# Patient Record
Sex: FEMALE | Employment: FULL TIME | URBAN - METROPOLITAN AREA
[De-identification: names, ages, dates, MRNs, and addresses within clinical notes are randomized per-mention and may not be internally consistent; named-entity substitution may affect disease eponyms.]

---

## 2018-10-11 ENCOUNTER — OFFICE VISIT (OUTPATIENT)
Dept: INTERNAL MEDICINE | Facility: CLINIC | Age: 28
End: 2018-10-11
Payer: COMMERCIAL

## 2018-10-11 VITALS
WEIGHT: 154.3 LBS | HEART RATE: 71 BPM | BODY MASS INDEX: 22.78 KG/M2 | DIASTOLIC BLOOD PRESSURE: 68 MMHG | TEMPERATURE: 98.1 F | OXYGEN SATURATION: 99 % | SYSTOLIC BLOOD PRESSURE: 100 MMHG

## 2018-10-11 DIAGNOSIS — O22.00 VARICOSE VEINS DURING PREGNANCY, ANTEPARTUM: Primary | ICD-10-CM

## 2018-10-11 PROCEDURE — 99213 OFFICE O/P EST LOW 20 MIN: CPT | Performed by: PHYSICIAN ASSISTANT

## 2018-10-11 NOTE — MR AVS SNAPSHOT
"              After Visit Summary   10/11/2018    Omari Guan    MRN: [id number]           Patient Information     Date Of Birth          1990        Visit Information        Provider Department      10/11/2018 6:00 PM Carrie Lucero PA-C [facility]        Today's Diagnoses     Varicose veins during pregnancy, antepartum    -  1       Follow-ups after your visit        Who to contact     If you have questions or need follow up information about today's clinic visit or your schedule please contact [facility] directly at [phone].  Normal or non-critical lab and imaging results will be communicated to you by MyChart, letter or phone within 4 business days after the clinic has received the results. If you do not hear from us within 7 days, please contact the clinic through [employer] or phone. If you have a critical or abnormal lab result, we will notify you by phone as soon as possible.  Submit refill requests through [employer] or call your pharmacy and they will forward the refill request to us. Please allow 3 business days for your refill to be completed.          Additional Information About Your Visit        MyChart Information     [employer] lets you send messages to your doctor, view your test results, renew your prescriptions, schedule appointments and more. To sign up, go to [email or web address] . Click on \"Log in\" on the left side of the screen, which will take you to the Welcome page. Then click on \"Sign up Now\" on the right side of the page.     You will be asked to enter the access code listed below, as well as some personal information. Please follow the directions to create your username and password.     Your access code is: [email or web address]  Expires: 2019  6:25 PM     Your access code will  in 90 days. If you need help or a new code, please call your [facility] [place] or [phone].        Care EveryWhere ID     This is your " Care EveryWhere ID. This could be used by other organizations to access your [place] medical records  [id number]        Your Vitals Were     Pulse Temperature Pulse Oximetry BMI (Body Mass Index)          71 98.1  F (36.7  C) (Oral) 99% 22.78 kg/m2         Blood Pressure from Last 3 Encounters:   10/11/18 100/68   12/28/15 100/64   07/08/15 107/73    Weight from Last 3 Encounters:   10/11/18 154 lb 4.8 oz (70 kg)   12/28/15 148 lb 4.8 oz (67.3 kg)              Today, you had the following     No orders found for display         Today's Medication Changes          These changes are accurate as of 10/11/18  6:25 PM.  If you have any questions, ask your nurse or doctor.               Start taking these medicines.        Dose/Directions    order for DME   Used for:  Varicose veins during pregnancy, antepartum   Started by:  Carrie Lucero PA-C        Equipment being ordered: Full length compression tights  30-40 mmHg   Quantity:  1 Units   Refills:  1            Where to get your medicines      Some of these will need a paper prescription and others can be bought over the counter.  Ask your nurse if you have questions.     Bring a paper prescription for each of these medications     order for DME                Primary Care Provider Fax #    Physician No Ref-Primary [phone]       No address on file        Equal Access to Services     JANES OROZCO AH: Hadii ruthy romeroo Sorachid, waaxda luqadaha, qaybta kaalmada adeegkarl, coco mantilla. So [facility] [phone].    ATENCIÓN: Si habla español, tiene a yoder disposición servicios gratuitos de asistencia lingüística. Llame al [phone].    We comply with applicable federal civil rights laws and [place] laws. We do not discriminate on the basis of race, color, national origin, age, disability, sex, sexual orientation, or gender identity.            Thank you!     Thank you for choosing [facility]  for your  care. Our goal is always to provide you with excellent care. Hearing back from our patients is one way we can continue to improve our services. Please take a few minutes to complete the written survey that you may receive in the mail after your visit with us. Thank you!             Your Updated Medication List - Protect others around you: Learn how to safely use, store and throw away your medicines at www.disposemymeds.org.          This list is accurate as of 10/11/18  6:25 PM.  Always use your most recent med list.                   Brand Name Dispense Instructions for use Diagnosis    calcium-vitamin D-vitamin K 500-500-40 MG-UNT-MCG Chew    VIACTIV    100 tablet    Take 1 tablet by mouth 2 times daily (with meals) INDICATION: FOR BONE AND BREAST HEALTH, CHOCOLATE FLAVOR    Breast feeding status of mother       cholecalciferol 2000 units Caps     100 capsule    Take 2 capsules by mouth daily FOR VITAMIN D DEFICIENCY (LOW VITAMIN D)    Breast feeding status of mother       order for DME     1 Units    Equipment being ordered: Full length compression tights  30-40 mmHg    Varicose veins during pregnancy, antepartum       PRENATAL LOW IRON 27-1 MG Tabs     90 tablet    Take 1 tablet by mouth daily INDICATION: VITAMIN SUPPLEMENTATION    Breast feeding status of mother

## 2018-10-11 NOTE — PROGRESS NOTES
SUBJECTIVE:   Omari Guan is a 27 year old female who presents to clinic today for the following health issues:    Patient notes concerns for varicose veins. She is currently 19 weeks pregnant. She is planning to schedule an appointment with her midwife, but has not done so. She is planning on starting prenatal vitamins, but was unable to take them for some time due to vomiting. Pt notes leg soreness due to varicose veins. Pt notes it it is worsen when she is standing. Pt has not tried anything.     Problem list and histories reviewed & adjusted, as indicated.  Additional history: as documented    Reviewed and updated as needed this visit by clinical staff  Tobacco  Meds  Problems       Reviewed and updated as needed this visit by Provider  Meds  Problems         OBJECTIVE:     /68  Pulse 71  Temp 98.1  F (36.7  C) (Oral)  Wt 154 lb 4.8 oz (70 kg)  SpO2 99%  BMI 22.78 kg/m2  Body mass index is 22.78 kg/(m^2).  GENERAL: healthy, alert and no distress  MS: varicose veins noted without swelling, erythema or calf tenderness      ASSESSMENT/PLAN:     1. Varicose veins during pregnancy, antepartum  - treatment with compression stockings   - order for DME; Equipment being ordered: Full length compression tights    30-40 mmHg  Dispense: 1 Units; Refill: 1  - pt to schedule follow up with midwife   - pt agreed to plan and all questions are answered     Carrie Larkin PA-C  [facility]

## 2018-11-02 ENCOUNTER — TELEPHONE (OUTPATIENT)
Dept: INTERNAL MEDICINE | Facility: CLINIC | Age: 28
End: 2018-11-02

## 2018-11-02 DIAGNOSIS — I83.92 VARICOSE VEINS OF LEFT LOWER EXTREMITY, UNSPECIFIED WHETHER COMPLICATED: Primary | ICD-10-CM

## 2018-11-02 NOTE — TELEPHONE ENCOUNTER
Patient would like a prescriptions for thigh high compression socks because the tights are painful around her stomach.

## 2018-11-02 NOTE — TELEPHONE ENCOUNTER
New Compression stocking DME order Td'up. Please print and sign if appropriate.    Yanet MOTA RN, BSN, PHN

## 2018-11-05 NOTE — TELEPHONE ENCOUNTER
Please send to [facility] [phone]. Pt is requesting for compression to be less is this possible with the diagnosis?

## 2018-11-30 ENCOUNTER — OFFICE VISIT (OUTPATIENT)
Dept: INTERNAL MEDICINE | Facility: CLINIC | Age: 28
End: 2018-11-30
Payer: COMMERCIAL

## 2018-11-30 VITALS
WEIGHT: 167.4 LBS | DIASTOLIC BLOOD PRESSURE: 60 MMHG | TEMPERATURE: 98.6 F | BODY MASS INDEX: 24.71 KG/M2 | OXYGEN SATURATION: 100 % | HEART RATE: 77 BPM | SYSTOLIC BLOOD PRESSURE: 106 MMHG

## 2018-11-30 DIAGNOSIS — R10.84 ABDOMINAL PAIN, GENERALIZED: Primary | ICD-10-CM

## 2018-11-30 DIAGNOSIS — I83.92 VARICOSE VEINS OF LEFT LOWER EXTREMITY, UNSPECIFIED WHETHER COMPLICATED: ICD-10-CM

## 2018-11-30 PROCEDURE — 99213 OFFICE O/P EST LOW 20 MIN: CPT | Performed by: PHYSICIAN ASSISTANT

## 2018-11-30 NOTE — MR AVS SNAPSHOT
After Visit Summary   11/30/2018    Omari Guna    MRN: [id number]           Patient Information     Date Of Birth          1990        Visit Information        Provider Department      11/30/2018 10:20 AM Carire Lucero PA-C [facility]         Follow-ups after your visit        Follow-up notes from your care team     Return in about 3 days (around 12/3/2018) for Midwife follow up .      Who to contact     If you have questions or need follow up information about today's clinic visit or your schedule please contact [facility] directly at [phone].  Normal or non-critical lab and imaging results will be communicated to you by MyChart, letter or phone within 4 business days after the clinic has received the results. If you do not hear from us within 7 days, please contact the clinic through MyChart or phone. If you have a critical or abnormal lab result, we will notify you by phone as soon as possible.  Submit refill requests through [employer] or call your pharmacy and they will forward the refill request to us. Please allow 3 business days for your refill to be completed.          Additional Information About Your Visit        Care EveryWhere ID     This is your Care EveryWhere ID. This could be used by other organizations to access your [place] medical records  [id number]        Your Vitals Were     Pulse Temperature Pulse Oximetry BMI (Body Mass Index)          77 98.6  F (37  C) (Oral) 100% 24.71 kg/m2         Blood Pressure from Last 3 Encounters:   11/30/18 106/60   10/11/18 100/68   12/28/15 100/64    Weight from Last 3 Encounters:   11/30/18 167 lb 6.4 oz (75.9 kg)   10/11/18 154 lb 4.8 oz (70 kg)   12/28/15 148 lb 4.8 oz (67.3 kg)              Today, you had the following     No orders found for display       Primary Care Provider Fax #    Physician No Ref-Primary [phone]       No address on file        Equal  Access to Services     [facility] : Hadii ruthy sin eduardo Tsang, wachanda luqadaha, qaybta kaalmacoco beltran. So [facility] [phone].    ATENCIÓN: Si habla español, tiene a yoder disposición servicios gratuitos de asistencia lingüística. Llame al [phone].    We comply with applicable federal civil rights laws and [place] laws. We do not discriminate on the basis of race, color, national origin, age, disability, sex, sexual orientation, or gender identity.            Thank you!     Thank you for choosing [facility]  for your care. Our goal is always to provide you with excellent care. Hearing back from our patients is one way we can continue to improve our services. Please take a few minutes to complete the written survey that you may receive in the mail after your visit with us. Thank you!             Your Updated Medication List - Protect others around you: Learn how to safely use, store and throw away your medicines at www.disposemymeds.org.          This list is accurate as of 11/30/18 10:59 AM.  Always use your most recent med list.                   Brand Name Dispense Instructions for use Diagnosis    calcium-vitamin D-vitamin K 500-500-40 MG-UNT-MCG Chew    VIACTIV    100 tablet    Take 1 tablet by mouth 2 times daily (with meals) INDICATION: FOR BONE AND BREAST HEALTH, CHOCOLATE FLAVOR    Breast feeding status of mother       cholecalciferol 2000 units Caps     100 capsule    Take 2 capsules by mouth daily FOR VITAMIN D DEFICIENCY (LOW VITAMIN D)    Breast feeding status of mother       order for DME     1 Units    Equipment being ordered: Full length compression tights  30-40 mmHg    Varicose veins during pregnancy, antepartum       order for DME     1 each    Equipment being ordered: Thigh High Compression socks  20-30 mmhg    Varicose veins of left lower extremity, unspecified whether complicated       PRENATAL LOW IRON 27-1 MG Tabs      90 tablet    Take 1 tablet by mouth daily INDICATION: VITAMIN SUPPLEMENTATION    Breast feeding status of mother

## 2018-11-30 NOTE — PROGRESS NOTES
SUBJECTIVE:   Omari Guan is a 27 year old female who presents to clinic today for the following health issues:      Patient is here because she is having pelvic pain and lower back pain. Pt is also noting some discomfort in the thigh/hip area. Pt notes her right leg hurts occasionally up by the hip, occasionally hurts with movement, but she has no pain now. Pt also notes she feels like her belly is pressing the bladder and causing pain occasionally. She does not have this currently. Pt notes no urinary symptoms. Patient notes some discomfort on side of her abdomen by the pelvic occasion as well. Pt denies fever or body aches. Pt is 61/2 months pregnant and following with a midwife.    Problem list and histories reviewed & adjusted, as indicated.  Additional history: as documented      Reviewed and updated as needed this visit by clinical staff  Tobacco  Allergies  Meds  Problems       Reviewed and updated as needed this visit by Provider  Meds  Problems           OBJECTIVE:     /60  Pulse 77  Temp 98.6  F (37  C) (Oral)  Wt 167 lb 6.4 oz (75.9 kg)  SpO2 100%  BMI 24.71 kg/m2  Body mass index is 24.71 kg/(m^2).  GENERAL: healthy, alert and no distress  RESP: lungs clear to auscultation - no rales, rhonchi or wheezes  CV: regular rates and rhythm, normal S1 S2, no S3 or S4 and no murmur, click or rub  ABDOMEN: soft, nontender, without hepatosplenomegaly or masses and bowel sounds normal  MS: no tenderness to hips noted, full strength noted, but some pain with hip flexion on right side     ASSESSMENT/PLAN:     1. Abdominal pain, generalized  - reviewed symptoms with patient, she has no current symptoms or significant exam findings to indicate needing urgent evaluation    - her symptoms fit best with hip flexor pain on the right side and possible round ligament pain   - reviewed recommendation to notify her OB provider as soon as possible for complete review and evaluation since she is  pregnant   - if pain returns in the meantime reviewed signs and symptoms that should prompt urgent follow up     2. Varicose veins of left lower extremity, unspecified whether complicated  - pt requesting a second order for compression socks   - order for DME; Equipment being ordered: Thigh High Compression socks  20-30 mmhg  Dispense: 1 each; Refill: 1    Carrie Larkin PA-C  [facility]

## 2019-01-08 ENCOUNTER — TELEPHONE (OUTPATIENT)
Dept: INTERNAL MEDICINE | Facility: CLINIC | Age: 29
End: 2019-01-08

## 2019-01-08 DIAGNOSIS — I83.92 VARICOSE VEINS OF LEFT LOWER EXTREMITY, UNSPECIFIED WHETHER COMPLICATED: ICD-10-CM

## 2019-01-08 NOTE — TELEPHONE ENCOUNTER
Reason for Call: Request for an order or referral: DME    Order or referral being requested: compression stockings    Date needed:   Fax: to [facility] [address] [place], [place], [address]  Tele # [phone]    Has the patient been seen by the PCP for this problem? YES    Additional comments: pt wants 2 pairs one for home and one for outside of the house    Phone number Patient can be reached at:  Home number on file [phone] (home)    Best Time:  routine    Can we leave a detailed message on this number?  YES    Call taken on 1/8/2019 at 2:31 PM by Kanchan Talamantes

## 2019-01-09 NOTE — TELEPHONE ENCOUNTER
DME order printed to [facility], please fax as below. Thank you!

## 2019-06-22 ENCOUNTER — OFFICE VISIT (OUTPATIENT)
Dept: URGENT CARE | Facility: URGENT CARE | Age: 29
End: 2019-06-22
Payer: COMMERCIAL

## 2019-06-22 VITALS
BODY MASS INDEX: 23.03 KG/M2 | HEART RATE: 75 BPM | OXYGEN SATURATION: 99 % | DIASTOLIC BLOOD PRESSURE: 60 MMHG | RESPIRATION RATE: 16 BRPM | TEMPERATURE: 98 F | SYSTOLIC BLOOD PRESSURE: 108 MMHG | WEIGHT: 156 LBS

## 2019-06-22 DIAGNOSIS — K04.7 DENTAL ABSCESS: ICD-10-CM

## 2019-06-22 DIAGNOSIS — K08.89 PAIN, DENTAL: Primary | ICD-10-CM

## 2019-06-22 PROCEDURE — 96372 THER/PROPH/DIAG INJ SC/IM: CPT | Performed by: PHYSICIAN ASSISTANT

## 2019-06-22 PROCEDURE — 99214 OFFICE O/P EST MOD 30 MIN: CPT | Mod: 25 | Performed by: PHYSICIAN ASSISTANT

## 2019-06-22 RX ORDER — CEFTRIAXONE 1 G/1
1000 INJECTION, POWDER, FOR SOLUTION INTRAMUSCULAR; INTRAVENOUS ONCE
Qty: 1 EACH | Refills: 0 | OUTPATIENT
Start: 2019-06-22 | End: 2019-06-22

## 2019-06-22 RX ORDER — CEFTRIAXONE SODIUM 1 G
1 VIAL (EA) INJECTION ONCE
Status: COMPLETED | OUTPATIENT
Start: 2019-06-22 | End: 2019-06-22

## 2019-06-22 RX ORDER — AMOXICILLIN 875 MG
875 TABLET ORAL 2 TIMES DAILY
Qty: 20 TABLET | Refills: 0 | Status: SHIPPED | OUTPATIENT
Start: 2019-06-22 | End: 2019-07-02

## 2019-06-22 RX ORDER — IBUPROFEN 800 MG/1
800 TABLET, FILM COATED ORAL EVERY 8 HOURS PRN
Qty: 100 TABLET | Refills: 0 | Status: SHIPPED | OUTPATIENT
Start: 2019-06-22

## 2019-06-22 RX ADMIN — Medication 1 G: at 18:46

## 2019-06-22 NOTE — PROGRESS NOTES
Patient presents with:  Urgent Care: Tooth pain and facial swelling      SUBJECTIVE:   Omari Guan is a 28 year old female presenting with a chief complaint of left sided lower face swelling and pain since having a root canal.  No fevers.    No other trauma.    Onset was today.      Course of illness is worsening.    Severity moderate  Current and Associated symptoms:   Treatment measures tried include Tylenol/Ibuprofen.  Predisposing factors include recent root canal    SH: patient is nursing her 2 month old.      PMH  Recent root canal         There is no problem list on file for this patient.    Social History     Tobacco Use     Smoking status: Never Smoker     Smokeless tobacco: Never Used   Substance Use Topics     Alcohol use: No     Alcohol/week: 0.0 oz       ROS:  CONSTITUTIONAL:NEGATIVE for fever, chills, change in weight  INTEGUMENTARY/SKIN: NEGATIVE for worrisome rashes, moles or lesions  ENT/MOUTH: as per HPI  RESP:NEGATIVE for significant cough or SOB  CV: NEGATIVE for chest pain, palpitations or peripheral edema  : as per HPI  NEURO: NEGATIVE for weakness, dizziness or paresthesias  Review of systems negative except as stated above.    OBJECTIVE  :/60   Pulse 75   Temp 98  F (36.7  C) (Oral)   Resp 16   Wt 70.8 kg (156 lb)   SpO2 99%   Breastfeeding? Unknown   BMI 23.03 kg/m    GENERAL APPEARANCE: healthy, alert and no distress  EYES: EOMI,  PERRL, conjunctiva clear  FACE: swelling over left lower jaw adjacent to area of root canal.    OP: erythema and swelling of gingiva on left lower jaw.    After applying topical let for 20 minutes with gentle pressure, the abscess drained on its own in clinic.    NECK: supple, nontender, no lymphadenopathy  RESP: lungs clear to auscultation - no rales, rhonchi or wheezes  CV: regular rates and rhythm, normal S1 S2, no murmur noted  NEURO: Normal strength and tone, sensory exam grossly normal,  normal speech and mentation  SKIN: no suspicious  lesions or rashes    (K04.7) Dental abscess  (primary encounter diagnosis)  Comment:   Plan: amoxicillin (AMOXIL) 875 MG tablet, cefTRIAXone        (ROCEPHIN) 1 GM vial          (K08.89) Pain, dental  (primary encounter diagnosis)  Comment:   Plan: ibuprofen (ADVIL/MOTRIN) 800 MG tablet      Warm compress to area.      Handout on dental abscess given.     Follow up with dentist.      Patient expresses understanding and agreement with the assessment and plan as above.

## 2019-06-23 NOTE — PATIENT INSTRUCTIONS
"(K04.7) Dental abscess  (primary encounter diagnosis)  Comment:   Plan: amoxicillin (AMOXIL) 875 MG tablet, cefTRIAXone        (ROCEPHIN) 1 GM vial            Patient Education     Dental Abscess    An abscess is a pocket of pus at the tip of a tooth root in your jaw bone. It is caused by an infection at the root of the tooth. It can cause pain and swelling of the gum, cheek, or jaw. Pain may spread from the tooth to your ear or the area of your jaw on the same side. If the abscess isn t treated, it appears as a bubble or swelling on the gum near the tooth. The pressure that builds in this swelling is the source of the pain. More serious infections cause your face to swell.  An abscess can be caused by a crack in the tooth, a cavity, a gum infection, or a combination of these. Once the pulp of the tooth is exposed, bacteria can spread down the roots to the tip. If the bacteria are not stopped, they can damage the bone and soft tissue, and an abscess can form.  Home care  Follow these guidelines when caring for yourself at home:    Don't have hot and cold foods and drinks. Your tooth may be sensitive to changes in temperature. Don t chew on the side of the infected tooth.    If your tooth is chipped or cracked, or if there is a large open cavity, put oil of cloves directly on the tooth to relieve pain. You can buy oil of cloves at drugstores. Some pharmacies carry an over-the-counter \"toothache kit.\" This contains a paste that you can put on the exposed tooth to make it less sensitive.    Put a cold pack on your jaw over the sore area to help reduce pain.    You may use over-the-counter medicine to ease pain, unless another medicine was prescribed. If you have chronic liver or kidney disease, talk with your healthcare provider before using acetaminophen or ibuprofen. Also talk with your provider if you ve had a stomach ulcer or GI bleeding.    An antibiotic will be prescribed. Take it until finished, even if you are " feeling better after a few days.  Follow-up care  Follow up with your dentist or an oral surgeon, or as advised. Once an infection occurs in a tooth, it will continue to be a problem until the infection is drained. This is done through surgery or a root canal. Or you may need to have your tooth pulled.  Call 911  Call 911 if any of these occur:    Unusual drowsiness    Headache or stiff neck    Weakness or fainting    Difficulty swallowing, breathing, or opening your mouth    Swollen eyelids  When to seek medical advice  Call your healthcare provider right away if any of these occur:    Your face becomes more swollen or red    Pain gets worse or spreads to your neck    Fever of 100.4  F (38.0  C) or higher, or as directed by your healthcare provider    Pus drains from the tooth  Date Last Reviewed: 10/1/2016    0728-6735 [employer]. [address] [place] [address], [place] [address]. All rights reserved. This information is not intended as a substitute for professional medical care. Always follow your healthcare professional's instructions.

## 2020-03-11 ENCOUNTER — NURSE TRIAGE (OUTPATIENT)
Dept: NURSING | Facility: CLINIC | Age: 30
End: 2020-03-11

## 2020-03-12 NOTE — TELEPHONE ENCOUNTER
Omari reports that her symptoms of vomiting, diarrhea, nausea, chills, abdominal cramps have subsided. No symptoms today. She asked about her 's condition and her daughter's concerns. Triaged each family member individually.    Advised to call back for further concerns.    Kassie Corbin RN/[place] Nurse Advisor    Reason for Disposition    Health Information question, no triage required and triager able to answer question    Protocols used: INFORMATION ONLY CALL-A-AH

## 2020-08-17 ENCOUNTER — VIRTUAL VISIT (OUTPATIENT)
Dept: FAMILY MEDICINE | Facility: OTHER | Age: 30
End: 2020-08-17
Payer: COMMERCIAL

## 2020-08-17 PROCEDURE — 99421 OL DIG E/M SVC 5-10 MIN: CPT | Performed by: FAMILY MEDICINE

## 2020-08-18 DIAGNOSIS — Z20.822 SUSPECTED 2019 NOVEL CORONAVIRUS INFECTION: Primary | ICD-10-CM

## 2020-08-18 NOTE — PROGRESS NOTES
"Date: 2020 20:27:41  Clinician: Rigo Montes  Clinician NPI: [id number]  Patient: Omari Guan  Patient : 1990  Patient Address: [address] [place] [address]  Patient Phone: [phone]  Visit Protocol: URI  Patient Summary:  Omari is a 29 year old ( : 1990 ) female who initiated a Visit for COVID-19 (Coronavirus) evaluation and screening. When asked the question \"Please sign me up to receive news, health information and promotions from [employer].\", Omari responded \"No\".    Omari states her symptoms started 1-2 days ago.   Her symptoms consist of a headache, chills, diarrhea, a cough, nasal congestion, and malaise. Omari also feels feverish.   Symptom details     Nasal secretions: The color of her mucus is clear.    Cough: Omari coughs a few times an hour and her cough is not more bothersome at night. Phlegm does not come into her throat when she coughs. She does not believe her cough is caused by post-nasal drip.     Temperature: Her current temperature is 98.96 degrees Fahrenheit.     Headache: She states the headache is mild (1-3 on a 10 point pain scale).      Omari denies having ear pain, enlarged lymph nodes, nausea, sore throat, teeth pain, ageusia, vomiting, rhinitis, myalgias, anosmia, facial pain or pressure, and wheezing. She also denies having a sinus infection within the past year, taking antibiotic medication in the past month, and having recent facial or sinus surgery in the past 60 days. She is not experiencing dyspnea.   Precipitating events  She has not recently been exposed to someone with influenza. Omari has been in close contact with the following high risk individuals: children under the age of 5.   Pertinent COVID-19 (Coronavirus) information  In the past 14 days, Omari has not worked in a congregate living setting.   She does not work or volunteer as healthcare worker or a  and does not work or volunteer " in a healthcare facility.   Omari also has not lived in a congregate living setting in the past 14 days. She does not live with a healthcare worker.   Omari has not had a close contact with a laboratory-confirmed COVID-19 patient within 14 days of symptom onset.   Since December 2019, Omari and has not had upper respiratory infection or influenza-like illness. Has not been diagnosed with lab-confirmed COVID-19 test   Pertinent medical history  Omari does not get yeast infections when she takes antibiotics.   Omari does not need a return to work/school note.   Weight: 145 lbs   Omari does not smoke or use smokeless tobacco.   She denies pregnancy and denies breastfeeding. She has menstruated in the past month.   Additional information as reported by the patient (free text): dizziness   Weight: 145 lbs    MEDICATIONS: No current medications, ALLERGIES: NKDA  Clinician Response:  Dear Omari,   Your symptoms show that you may have coronavirus (COVID-19). This illness can cause fever, cough and trouble breathing. Many people get a mild case and get better on their own. Some people can get very sick.  What should I do?  We would like to test you for this virus.   1. Please call [phone] to schedule your visit. Explain that you were referred by [facility] to have a COVID-19 test. Be ready to share your [facility] visit ID number.  The following will serve as your written order for this COVID Test, ordered by me, for the indication of suspected COVID [Z20.828]: The test will be ordered in [facility], our electronic health record, after you are scheduled. It will show as ordered and authorized by Tristan Elkins MD.  Order: COVID-19 (Coronavirus) PCR for SYMPTOMATIC testing from [facility].      2. When it's time for your COVID test:  Stay at least 6 feet away from others. (If someone will drive you to your test, stay in the backseat, as far away from the  as you can.)   Cover your mouth and nose with a mask, tissue  "or washcloth.  Go straight to the testing site. Don't make any stops on the way there or back.      3.Starting now: Stay home and away from others (self-isolate) until:   You've had no fever---and no medicine that reduces fever---for one full day (24 hours). And...   Your other symptoms have gotten better. For example, your cough or breathing has improved. And...   At least 10 days have passed since your symptoms started.       During this time, don't leave the house except for testing or medical care.   Stay in your own room, even for meals. Use your own bathroom if you can.   Stay away from others in your home. No hugging, kissing or shaking hands. No visitors.  Don't go to work, school or anywhere else.    Clean \"high touch\" surfaces often (doorknobs, counters, handles, etc.). Use a household cleaning spray or wipes. You'll find a full list of  on the EPA website: www.epa.gov/pesticide-registration/list-n-disinfectants-use-against-sars-cov-2.   Cover your mouth and nose with a mask, tissue or washcloth to avoid spreading germs.  Wash your hands and face often. Use soap and water.  Caregivers in these groups are at risk for severe illness due to COVID-19:  o People 65 years and older  o People who live in a nursing home or long-term care facility  o People with chronic disease (lung, heart, cancer, diabetes, kidney, liver, immunologic)  o People who have a weakened immune system, including those who:   Are in cancer treatment  Take medicine that weakens the immune system, such as corticosteroids  Had a bone marrow or organ transplant  Have an immune deficiency  Have poorly controlled HIV or AIDS  Are obese (body mass index of 40 or higher)  Smoke regularly   o Caregivers should wear gloves while washing dishes, handling laundry and cleaning bedrooms and bathrooms.  o Use caution when washing and drying laundry: Don't shake dirty laundry, and use the warmest water setting that you can.  o For more tips, go " to www.cdc.gov/coronavirus/2019-ncov/downloads/10Things.pdf.    4.Sign up for [employer]. We know it's scary to hear that you might have COVID-19. We want to track your symptoms to make sure you're okay over the next 2 weeks. Please look for an email from [employer]---this is a free, online program that we'll use to keep in touch. To sign up, follow the link in the email. Learn more at [email or web address]  How can I take care of myself?   Get lots of rest. Drink extra fluids (unless a doctor has told you not to).   Take Tylenol (acetaminophen) for fever or pain. If you have liver or kidney problems, ask your family doctor if it's okay to take Tylenol.   Adults can take either:    650 mg (two 325 mg pills) every 4 to 6 hours, or...   1,000 mg (two 500 mg pills) every 8 hours as needed.    Note: Don't take more than 3,000 mg in one day. Acetaminophen is found in many medicines (both prescribed and over-the-counter medicines). Read all labels to be sure you don't take too much.   For children, check the Tylenol bottle for the right dose. The dose is based on the child's age or weight.    If you have other health problems (like cancer, heart failure, an organ transplant or severe kidney disease): Call your specialty clinic if you don't feel better in the next 2 days.       Know when to call 911. Emergency warning signs include:    Trouble breathing or shortness of breath Pain or pressure in the chest that doesn't go away Feeling confused like you haven't felt before, or not being able to wake up Bluish-colored lips or face.  Where can I get more information?    [facility] -- About COVID-19: [email or web address]   CDC -- What to Do If You're Sick: www.cdc.gov/coronavirus/2019-ncov/about/steps-when-sick.html   CDC -- Ending Home Isolation: www.cdc.gov/coronavirus/2019-ncov/hcp/disposition-in-home-patients.html   CDC -- Caring for Someone:  www.cdc.gov/coronavirus/2019-ncov/if-you-are-sick/care-for-someone.html   [facility] -- Interim Guidance for Hospital Discharge to Home: www.health.[facility].[place]/diseases/coronavirus/hcp/hospdischarge.pdf   [facility] clinical trials (COVID-19 research studies): clinicalaffairs.[facility]-clinical-trials    Below are the COVID-19 hotlines at the [place] Department of Health ([facility]). Interpreters are available.    For health questions: Call 879-986-2602 or 1-187.464.6530 (7 a.m. to 7 p.m.) For questions about schools and childcare: Call 186-698-6150 or 1-742.499.2875 (7 a.m. to 7 p.m.)    Diagnosis: Cough  Diagnosis ICD: R05

## 2020-08-19 DIAGNOSIS — Z20.822 SUSPECTED 2019 NOVEL CORONAVIRUS INFECTION: ICD-10-CM

## 2020-08-19 PROCEDURE — U0003 INFECTIOUS AGENT DETECTION BY NUCLEIC ACID (DNA OR RNA); SEVERE ACUTE RESPIRATORY SYNDROME CORONAVIRUS 2 (SARS-COV-2) (CORONAVIRUS DISEASE [COVID-19]), AMPLIFIED PROBE TECHNIQUE, MAKING USE OF HIGH THROUGHPUT TECHNOLOGIES AS DESCRIBED BY CMS-2020-01-R: HCPCS | Performed by: FAMILY MEDICINE

## 2020-08-20 LAB
SARS-COV-2 RNA SPEC QL NAA+PROBE: ABNORMAL
SPECIMEN SOURCE: ABNORMAL

## 2020-12-27 ENCOUNTER — HEALTH MAINTENANCE LETTER (OUTPATIENT)
Age: 30
End: 2020-12-27

## 2021-10-03 ENCOUNTER — HEALTH MAINTENANCE LETTER (OUTPATIENT)
Age: 31
End: 2021-10-03

## 2022-01-23 ENCOUNTER — HEALTH MAINTENANCE LETTER (OUTPATIENT)
Age: 32
End: 2022-01-23

## 2022-09-11 ENCOUNTER — HEALTH MAINTENANCE LETTER (OUTPATIENT)
Age: 32
End: 2022-09-11

## 2023-04-30 ENCOUNTER — HEALTH MAINTENANCE LETTER (OUTPATIENT)
Age: 33
End: 2023-04-30